# Patient Record
Sex: MALE | Race: WHITE | Employment: FULL TIME
[De-identification: names, ages, dates, MRNs, and addresses within clinical notes are randomized per-mention and may not be internally consistent; named-entity substitution may affect disease eponyms.]

---

## 2024-04-03 ENCOUNTER — OFFICE VISIT (OUTPATIENT)
Dept: INTERNAL MEDICINE CLINIC | Facility: CLINIC | Age: 49
End: 2024-04-03
Payer: COMMERCIAL

## 2024-04-03 VITALS
HEIGHT: 73 IN | BODY MASS INDEX: 34.33 KG/M2 | OXYGEN SATURATION: 97 % | DIASTOLIC BLOOD PRESSURE: 91 MMHG | TEMPERATURE: 98.2 F | SYSTOLIC BLOOD PRESSURE: 133 MMHG | WEIGHT: 259 LBS | HEART RATE: 77 BPM

## 2024-04-03 DIAGNOSIS — G47.33 OSA ON CPAP: ICD-10-CM

## 2024-04-03 DIAGNOSIS — Z76.89 ESTABLISHING CARE WITH NEW DOCTOR, ENCOUNTER FOR: Primary | ICD-10-CM

## 2024-04-03 DIAGNOSIS — E83.52 HYPERCALCEMIA: ICD-10-CM

## 2024-04-03 DIAGNOSIS — E66.09 CLASS 1 OBESITY DUE TO EXCESS CALORIES WITH SERIOUS COMORBIDITY AND BODY MASS INDEX (BMI) OF 34.0 TO 34.9 IN ADULT: ICD-10-CM

## 2024-04-03 DIAGNOSIS — E55.9 VITAMIN D DEFICIENCY: ICD-10-CM

## 2024-04-03 DIAGNOSIS — R00.2 PALPITATIONS: ICD-10-CM

## 2024-04-03 DIAGNOSIS — I10 PRIMARY HYPERTENSION: ICD-10-CM

## 2024-04-03 LAB
CREAT UR-MCNC: 121 MG/DL
MICROALBUMIN UR-MCNC: 6.57 MG/DL
MICROALBUMIN/CREAT UR-RTO: 54 MG/G (ref 0–30)

## 2024-04-03 PROCEDURE — 3075F SYST BP GE 130 - 139MM HG: CPT | Performed by: INTERNAL MEDICINE

## 2024-04-03 PROCEDURE — 3080F DIAST BP >= 90 MM HG: CPT | Performed by: INTERNAL MEDICINE

## 2024-04-03 PROCEDURE — 99204 OFFICE O/P NEW MOD 45 MIN: CPT | Performed by: INTERNAL MEDICINE

## 2024-04-03 PROCEDURE — G8427 DOCREV CUR MEDS BY ELIG CLIN: HCPCS | Performed by: INTERNAL MEDICINE

## 2024-04-03 PROCEDURE — G8417 CALC BMI ABV UP PARAM F/U: HCPCS | Performed by: INTERNAL MEDICINE

## 2024-04-03 PROCEDURE — 1036F TOBACCO NON-USER: CPT | Performed by: INTERNAL MEDICINE

## 2024-04-03 RX ORDER — CHLORTHALIDONE 25 MG/1
25 TABLET ORAL DAILY
COMMUNITY
Start: 2023-12-04

## 2024-04-03 RX ORDER — BUTYROSPERMUM PARKII(SHEA BUTTER), SIMMONDSIA CHINENSIS (JOJOBA) SEED OIL, ALOE BARBADENSIS LEAF EXTRACT .01; 1; 3.5 G/100G; G/100G; G/100G
5000 LIQUID TOPICAL DAILY
Qty: 90 CAPSULE | Refills: 5 | Status: SHIPPED | OUTPATIENT
Start: 2024-04-03

## 2024-04-03 RX ORDER — TRAZODONE HYDROCHLORIDE 50 MG/1
50 TABLET ORAL NIGHTLY PRN
COMMUNITY
Start: 2023-12-04

## 2024-04-03 SDOH — ECONOMIC STABILITY: INCOME INSECURITY: HOW HARD IS IT FOR YOU TO PAY FOR THE VERY BASICS LIKE FOOD, HOUSING, MEDICAL CARE, AND HEATING?: NOT HARD AT ALL

## 2024-04-03 SDOH — ECONOMIC STABILITY: HOUSING INSECURITY
IN THE LAST 12 MONTHS, WAS THERE A TIME WHEN YOU DID NOT HAVE A STEADY PLACE TO SLEEP OR SLEPT IN A SHELTER (INCLUDING NOW)?: NO

## 2024-04-03 SDOH — ECONOMIC STABILITY: FOOD INSECURITY: WITHIN THE PAST 12 MONTHS, THE FOOD YOU BOUGHT JUST DIDN'T LAST AND YOU DIDN'T HAVE MONEY TO GET MORE.: NEVER TRUE

## 2024-04-03 SDOH — ECONOMIC STABILITY: FOOD INSECURITY: WITHIN THE PAST 12 MONTHS, YOU WORRIED THAT YOUR FOOD WOULD RUN OUT BEFORE YOU GOT MONEY TO BUY MORE.: NEVER TRUE

## 2024-04-03 ASSESSMENT — PATIENT HEALTH QUESTIONNAIRE - PHQ9
2. FEELING DOWN, DEPRESSED OR HOPELESS: NOT AT ALL
SUM OF ALL RESPONSES TO PHQ QUESTIONS 1-9: 0
SUM OF ALL RESPONSES TO PHQ QUESTIONS 1-9: 0
1. LITTLE INTEREST OR PLEASURE IN DOING THINGS: NOT AT ALL
SUM OF ALL RESPONSES TO PHQ QUESTIONS 1-9: 0
SUM OF ALL RESPONSES TO PHQ9 QUESTIONS 1 & 2: 0
SUM OF ALL RESPONSES TO PHQ QUESTIONS 1-9: 0

## 2024-04-03 NOTE — PROGRESS NOTES
Jt Rouse (: 1975) is a 49 y.o. male, here for evaluation of the following chief complaint(s):  New Patient (Pt is here to estab PCP care. Pt has moved here for [place], [place])       ASSESSMENT/PLAN:  1. Establishing care with new doctor, encounter for  2. Primary hypertension  3. Class 1 obesity due to excess calories with serious comorbidity and body mass index (BMI) of 34.0 to 34.9 in adult  4. Vitamin D deficiency  The following orders have not been finalized:  -     vitamin D (D3 5000) 125 MCG (5000 UT) CAPS capsule  5. Hypercalcemia  6. JOAN on CPAP  7. Palpitations             SUBJECTIVE/OBJECTIVE:  HPI: Patient is a 49-year-old male with medical history significant for hypertension, and obesity presenting to establish care with a new PCP.  Patient recently moved from the [facility].  Blood pressure at establishment 133/91.  Weight currently 259 with a BMI of 34.17.  Patient previously seen within the [facility] system but forced to change secondary to insurance and hospital system conflict.  He is compliant with CPAP for obstructive sleep apnea though does have concerns of intermittent palpitations.  Labs reviewed from the [facility] system drawn within the past year.  Patient with an elevated calcium level and will be pursued at this time with metabolic panel, parathyroid hormone and vitamin D level.  Lipid panel with elevations in total cholesterol as well as LDL cholesterol into the borderline high range.  LDL at 154.  Total 219.VLDL cholesterol elevated to 178 in the high range.  Offering referral to cardiology for further evaluation of palpitations with sleep apnea and elevated cholesterol profile.  Blood pressure elevated in office today at 133/91.  He has a blood pressure machine at home and will be getting monitoring blood pressure readings in a relaxed state of his feet flat on the ground after having rested for 5 minutes.  Arm at the level of the heart cuff above the elbow.  Patient is agreeable

## 2024-04-03 NOTE — ACP (ADVANCE CARE PLANNING)
Advance Care Planning   The patient has the following advanced directives on file:  Advance Directives       Power of  Living Will ACP-Advance Directive ACP-Power of     Not on File Not on File Not on File Not on File            The patient has appointed the following active healthcare agents:    Primary Decision Maker: Keya Rouse - Spouse - [phone]    The Patient has the following current code status:    Code Status: Not on file    Visit Documentation:  I discussed Advance Care Planning with Jt Nasim today which included the importance of making their choices for care and treatment in the case of a health event that adversely affects their decision-making abilities. He has not completed the Advance Care Directives. He does not have an active health care agent at this time.  Jt Rouse was encouraged to complete the declaration forms and provide a signed copy of his medical records.  I advised patient we would continue this discussion at future visits.     Wen Garcia  4/3/2024

## 2024-04-04 LAB
25(OH)D3 SERPL-MCNC: 22.9 NG/ML (ref 30–100)
ALBUMIN SERPL-MCNC: 4.3 G/DL (ref 3.5–5)
ALBUMIN/GLOB SERPL: 1.3 (ref 0.4–1.6)
ALP SERPL-CCNC: 59 U/L (ref 50–136)
ALT SERPL-CCNC: 52 U/L (ref 12–65)
ANION GAP SERPL CALC-SCNC: 6 MMOL/L (ref 2–11)
AST SERPL-CCNC: 28 U/L (ref 15–37)
BILIRUB SERPL-MCNC: 0.6 MG/DL (ref 0.2–1.1)
BUN SERPL-MCNC: 12 MG/DL (ref 6–23)
CALCIUM SERPL-MCNC: 9.3 MG/DL (ref 8.3–10.4)
CALCIUM SERPL-MCNC: 9.7 MG/DL (ref 8.3–10.4)
CHLORIDE SERPL-SCNC: 104 MMOL/L (ref 103–113)
CO2 SERPL-SCNC: 27 MMOL/L (ref 21–32)
CREAT SERPL-MCNC: 1 MG/DL (ref 0.8–1.5)
GLOBULIN SER CALC-MCNC: 3.2 G/DL (ref 2.8–4.5)
GLUCOSE SERPL-MCNC: 106 MG/DL (ref 65–100)
POTASSIUM SERPL-SCNC: 4.1 MMOL/L (ref 3.5–5.1)
PROT SERPL-MCNC: 7.5 G/DL (ref 6.3–8.2)
PTH-INTACT SERPL-MCNC: 50.9 PG/ML (ref 18.5–88)
SODIUM SERPL-SCNC: 137 MMOL/L (ref 136–146)
TSH W FREE THYROID IF ABNORMAL: 0.74 UIU/ML (ref 0.36–3.74)

## 2024-05-06 ENCOUNTER — INITIAL CONSULT (OUTPATIENT)
Age: 49
End: 2024-05-06
Payer: COMMERCIAL

## 2024-05-06 VITALS
HEART RATE: 71 BPM | HEIGHT: 73 IN | DIASTOLIC BLOOD PRESSURE: 88 MMHG | SYSTOLIC BLOOD PRESSURE: 132 MMHG | WEIGHT: 250.2 LBS | BODY MASS INDEX: 33.16 KG/M2

## 2024-05-06 DIAGNOSIS — R00.2 PALPITATIONS: Primary | ICD-10-CM

## 2024-05-06 DIAGNOSIS — E78.2 MIXED HYPERLIPIDEMIA: ICD-10-CM

## 2024-05-06 DIAGNOSIS — Z82.49 FAMILY HISTORY OF PREMATURE CAD: ICD-10-CM

## 2024-05-06 DIAGNOSIS — G47.33 OSA ON CPAP: ICD-10-CM

## 2024-05-06 DIAGNOSIS — I10 ESSENTIAL HYPERTENSION: ICD-10-CM

## 2024-05-06 PROCEDURE — 93000 ELECTROCARDIOGRAM COMPLETE: CPT | Performed by: INTERNAL MEDICINE

## 2024-05-06 PROCEDURE — 3075F SYST BP GE 130 - 139MM HG: CPT | Performed by: INTERNAL MEDICINE

## 2024-05-06 PROCEDURE — G8417 CALC BMI ABV UP PARAM F/U: HCPCS | Performed by: INTERNAL MEDICINE

## 2024-05-06 PROCEDURE — 99204 OFFICE O/P NEW MOD 45 MIN: CPT | Performed by: INTERNAL MEDICINE

## 2024-05-06 PROCEDURE — G8427 DOCREV CUR MEDS BY ELIG CLIN: HCPCS | Performed by: INTERNAL MEDICINE

## 2024-05-06 PROCEDURE — 3079F DIAST BP 80-89 MM HG: CPT | Performed by: INTERNAL MEDICINE

## 2024-05-06 RX ORDER — VALSARTAN 160 MG/1
160 TABLET ORAL DAILY
Qty: 30 TABLET | Refills: 11 | Status: SHIPPED | OUTPATIENT
Start: 2024-05-06

## 2024-05-06 ASSESSMENT — ENCOUNTER SYMPTOMS
HEMOPTYSIS: 0
WHEEZING: 0
HEMATEMESIS: 0
ABDOMINAL PAIN: 0
HEMATOCHEZIA: 0
STRIDOR: 0
EYE REDNESS: 0
DOUBLE VISION: 0
HOARSE VOICE: 0

## 2024-05-06 NOTE — PROGRESS NOTES
Value Date/Time     04/03/2024 10:08 AM    K 4.1 04/03/2024 10:08 AM     04/03/2024 10:08 AM    CO2 27 04/03/2024 10:08 AM    BUN 12 04/03/2024 10:08 AM    GLOB 3.2 04/03/2024 10:08 AM    ALT 52 04/03/2024 10:08 AM    AST 28 04/03/2024 10:08 AM      No results found for: \"LDLDIRECT\"   Lab Results   Component Value Date    CREATININE 1.00 04/03/2024    No results found for: \"HGB\" No results found for: \"PLT\"     EKG from 5/6/2024 showed sinus rhythm at 71 bpm, minor nonspecific intraventricular conduction delay with a QRS duration of 102 ms-personally reviewed with him.      ASSESSMENT and PLAN      Palpitations  -     EKG 12 Lead - Clinic Performed  -Intermittent-overall not concerning.  Potentially stress related initially.  He has not had any episodes lately but if he does have any significant recurrence, we will get him through a 7-day Holter monitor at that point.    Essential hypertension  -     valsartan (DIOVAN) 160 MG tablet; Take 1 tablet by mouth daily  -Currently on chlorthalidone 25 mg daily but I will stop this and instead put him on valsartan-needs to be on a better long-acting meds such as an ARB rather than a diuretic at his age.    Mixed hyperlipidemia  -Diet controlled for now but would benefit from statin therapy-CT coronary calcium score is elevated especially with a family history of coronary artery disease.  Lifestyle measures were encouraged.  30 minutes of cardiovascular exercise-5 days a week was encouraged, try to cut out processed meat/TV dinners/processed foods in general, sweets can hopefully bring down cholesterol numbers especially with a high-fiber diet.    JOAN on CPAP  Continue CPAP therapy    Family history of premature CAD  -     CT CARDIAC CALCIUM SCORING; Future         Overall Impression  The only change made with his medical therapy was to stop his chlorthalidone and instead put him on valsartan 160 mg daily.  He can start with half a pill once daily and then